# Patient Record
Sex: FEMALE | URBAN - METROPOLITAN AREA
[De-identification: names, ages, dates, MRNs, and addresses within clinical notes are randomized per-mention and may not be internally consistent; named-entity substitution may affect disease eponyms.]

---

## 2023-06-26 ENCOUNTER — OFFICE VISIT (OUTPATIENT)
Dept: URBAN - METROPOLITAN AREA CLINIC 23 | Facility: CLINIC | Age: 78
End: 2023-06-26
Payer: COMMERCIAL

## 2023-06-26 DIAGNOSIS — Z79.4 LONG TERM (CURRENT) USE OF INSULIN: ICD-10-CM

## 2023-06-26 DIAGNOSIS — E11.9 DIABETES MELLITUS TYPE 2 WITHOUT MENTION OF COMPLICATION: Primary | ICD-10-CM

## 2023-06-26 DIAGNOSIS — Z96.1 PRESENCE OF INTRAOCULAR LENS: ICD-10-CM

## 2023-06-26 DIAGNOSIS — H16.143 PUNCTATE KERATITIS, BILATERAL: ICD-10-CM

## 2023-06-26 PROCEDURE — 92004 COMPRE OPH EXAM NEW PT 1/>: CPT | Performed by: STUDENT IN AN ORGANIZED HEALTH CARE EDUCATION/TRAINING PROGRAM

## 2023-06-26 PROCEDURE — 92250 FUNDUS PHOTOGRAPHY W/I&R: CPT | Performed by: STUDENT IN AN ORGANIZED HEALTH CARE EDUCATION/TRAINING PROGRAM

## 2023-06-26 ASSESSMENT — KERATOMETRY
OS: 44.25
OD: 44.00

## 2023-06-26 ASSESSMENT — INTRAOCULAR PRESSURE
OS: 16
OD: 15

## 2023-06-26 NOTE — IMPRESSION/PLAN
Impression: Diabetes mellitus Type 2 without mention of complication: K24.1.
 -- dx 30 yrs
-- controlled with last reported A1c of 7.1 Plan: Pt ed no signs diabetic retinopathy. Discuss benefits of steady blood glucose control and follow up care with PCP. Patient was instructed to monitor vision for sudden changes and to call if visual changes noted.  RTC for annual diabetic eye exam

## 2023-06-26 NOTE — IMPRESSION/PLAN
Impression: Presence of intraocular lens: Z96.1.
-- >10 yrs ago in [facility]: Clear s/p YAG & well positioned OU

## 2023-06-26 NOTE — IMPRESSION/PLAN
Impression: Punctate keratitis, bilateral: H16.143. Plan: Discussed contribution to initial symptoms of blur.  Recommend AT qid prn OU